# Patient Record
Sex: FEMALE | Race: BLACK OR AFRICAN AMERICAN | Employment: FULL TIME | URBAN - METROPOLITAN AREA
[De-identification: names, ages, dates, MRNs, and addresses within clinical notes are randomized per-mention and may not be internally consistent; named-entity substitution may affect disease eponyms.]

---

## 2018-06-27 ENCOUNTER — OFFICE VISIT (OUTPATIENT)
Dept: FAMILY MEDICINE | Facility: CLINIC | Age: 33
End: 2018-06-27
Payer: MEDICAID

## 2018-06-27 VITALS
RESPIRATION RATE: 21 BRPM | HEIGHT: 60 IN | BODY MASS INDEX: 30.9 KG/M2 | OXYGEN SATURATION: 98 % | SYSTOLIC BLOOD PRESSURE: 92 MMHG | DIASTOLIC BLOOD PRESSURE: 59 MMHG | TEMPERATURE: 98.1 F | WEIGHT: 157.38 LBS | HEART RATE: 60 BPM

## 2018-06-27 DIAGNOSIS — L81.1 MELASMA: Primary | ICD-10-CM

## 2018-06-27 NOTE — NURSING NOTE
Due to patient being non-English speaking/uses sign language, an  was used for this visit. Only for face-to-face interpretation by an external agency, date and length of interpretation can be found on the scanned worksheet.     name: Fanny Spears  Agency: [employer]  Language: José Miguelmo   Telephone number: [phone]  Type of interpretation: Face-to-face, spoken

## 2018-06-27 NOTE — PROGRESS NOTES
"       HPI:       Shireen Vazquez is a 33 year old  female with a significant past medical history of latent TB which has been treated who presents for the new concern(s) of    Skin discoloration   - Has been present for 2-3 years  - On her cheeks and forehead  - Has tried different lotions and creams, which are not helpful   - Started during one of her pregnancies, however has worsened over the last couple years   - Is not currently pregnant, however does not believe in birth control. Does not want to get pregnant. Using withdrawal method and denies wanting any other form of birth control.     An [employer]  was used for this visit.          PMHX:     Patient Active Problem List   Diagnosis     Female circumcision     Latent tuberculosis     Does not take any medications     Social History   Tobacco: never smoker   Alcohol: denies   Drugs: denies      No Known Allergies         Review of Systems:     10 point review of systems negative except for noted in HPI             Physical Exam:     Vitals:    06/27/18 1521   BP: 92/59   BP Location: Right arm   Patient Position: Sitting   Cuff Size: Adult Regular   Pulse: 60   Resp: 21   Temp: 98.1  F (36.7  C)   TempSrc: Oral   SpO2: 98%   Weight: 157 lb 6 oz (71.4 kg)   Height: 5' 0.43\" (153.5 cm)     Body mass index is 30.3 kg/(m^2).    Exam:  Constitutional: healthy, alert and no distress  Cardiovascular: Regular rate and rhythm. No murmurs, clicks gallops or rub  Respiratory: Lungs clear to auscultation. No wheezing or crackles present   Skin: Darkened macules and patches of skin covering bilateral cheeks and forehead. No other rashes or discoloration.   Psychiatric: mentation appears normal and affect normal/bright      Assessment and Plan     1. Melasma  Hyperpigmented macules which coalesce to patches in malar distribution over bilateral cheeks, bridge of nose and forehead is consistent with Melasma especially given initially started during a pregnancy and has " worsened with subsequent pregnancies. Given patient is no willing to use birth control at this time, do not feel tretinoin cream would be a good option. Will try an over the counter skin whitening agent, however may be limited by cost of product. Additionally recommended daily sunscreen usage to reduce further worsening of hyperpigmentation.   - Porcelana cream   - Sunscreen >30 spf daily      Options for treatment and follow-up care were reviewed with the patient and/or guardian. Shireen Vazquez and/or guardian engaged in the decision making process and verbalized understanding of the options discussed and agreed with the final plan.    Mona Kraus DO (PGY1)   Pager #[phone]    Precepted today with: Marlo Plunkett MD

## 2018-06-27 NOTE — PROGRESS NOTES
Preceptor Attestation:   Patient seen, evaluated and discussed with the resident. I have verified the content of the note, which accurately reflects my assessment of the patient and the plan of care.  Supervising Physician:Marlo Plunkett MD  [facility]

## 2018-06-27 NOTE — MR AVS SNAPSHOT
"              After Visit Summary   2018    Shireen Vazquez    MRN: [id number]           Patient Information     Date Of Birth          1985        Visit Information        Provider Department      2018 3:40 PM Mona Kraus DO [facility]        Today's Diagnoses     Melasma    -  1       Follow-ups after your visit        Follow-up notes from your care team     Return if symptoms worsen or fail to improve.      Who to contact     Please call your clinic at [phone] to:    Ask questions about your health    Make or cancel appointments    Discuss your medicines    Learn about your test results    Speak to your doctor            Additional Information About Your Visit        MyChart Information     [employer] is an electronic gateway that provides easy, online access to your medical records. With [employer], you can request a clinic appointment, read your test results, renew a prescription or communicate with your care team.     To sign up for [employer] visit the website at [email or web address]   You will be asked to enter the access code listed below, as well as some personal information. Please follow the directions to create your username and password.     Your access code is: [email or web address]  Expires: 2018 10:29 PM     Your access code will  in 90 days. If you need help or a new code, please contact your [facility] or call [phone] for assistance.        Care EveryWhere ID     This is your Care EveryWhere ID. This could be used by other organizations to access your [facility] medical records  [id number]        Your Vitals Were     Pulse Temperature Respirations Height Pulse Oximetry BMI (Body Mass Index)    60 98.1  F (36.7  C) (Oral) 21 5' 0.43\" (153.5 cm) 98% 30.3 kg/m2       Blood Pressure from Last 3 Encounters:   18 92/59   10/13/16 96/75   16 90/60    Weight from Last 3 Encounters:   18 157 lb 6 oz (71.4 kg)   16 156 " lb (70.8 kg)   09/22/16 154 lb 12.8 oz (70.2 kg)              Today, you had the following     No orders found for display         Today's Medication Changes          These changes are accurate as of 6/27/18 11:59 PM.  If you have any questions, ask your nurse or doctor.               Stop taking these medicines if you haven't already. Please contact your care team if you have questions.     acetaminophen 325 MG tablet   Commonly known as:  TYLENOL   Stopped by:  Mona Kraus DO           ferrous sulfate 325 (65 Fe) MG tablet   Commonly known as:  IRON   Stopped by:  Mona Kraus DO           ibuprofen 600 MG tablet   Commonly known as:  ADVIL/MOTRIN   Stopped by:  Mona Kraus DO           oxyCODONE IR 5 MG tablet   Commonly known as:  ROXICODONE   Stopped by:  Mona Kraus DO           PRENATAL 19 29-1 MG Chew   Stopped by:  Mona Kraus DO           senna-docusate 8.6-50 MG per tablet   Commonly known as:  SENOKOT-S;PERICOLACE   Stopped by:  Mona Kraus DO                    Primary Care Provider Office Phone #    Mona ROBERT Kraus [phone]       [address] [place] [address]        Equal Access to Services     COLIN NORTON AH: Hadii mariluz mareso Sovirgilio, waaxda luqadaha, qaybta kaalmada adeegyada, carline levi. So [facility] [phone].    ATENCIÓN: Si habla español, tiene a lopez disposición servicios gratuitos de asistencia lingüística. Llame al [phone].    We comply with applicable federal civil rights laws and [place] laws. We do not discriminate on the basis of race, color, national origin, age, disability, sex, sexual orientation, or gender identity.            Thank you!     Thank you for choosing [facility]  for your care. Our goal is always to provide you with excellent care. Hearing back from our patients is one way we can continue to improve our services. Please take a few minutes to complete the written survey that  you may receive in the mail after your visit with us. Thank you!             Your Updated Medication List - Protect others around you: Learn how to safely use, store and throw away your medicines at www.disposemymeds.org.      Notice  As of 6/27/2018 11:59 PM    You have not been prescribed any medications.

## 2018-06-28 NOTE — NURSING NOTE
Due to patient being non-English speaking/uses sign language, an  was used for this visit. Only for face-to-face interpretation by an external agency, date and length of interpretation can be found on the scanned worksheet.     name: america Escobedo  Agency: [facility]  Language: [facility]   Telephone number: [phone]  Type of interpretation: Face-to-face, spoken

## 2019-01-24 ENCOUNTER — OFFICE VISIT (OUTPATIENT)
Dept: FAMILY MEDICINE | Facility: CLINIC | Age: 34
End: 2019-01-24
Payer: COMMERCIAL

## 2019-01-24 VITALS
BODY MASS INDEX: 31.06 KG/M2 | HEART RATE: 57 BPM | WEIGHT: 158.2 LBS | RESPIRATION RATE: 20 BRPM | DIASTOLIC BLOOD PRESSURE: 61 MMHG | OXYGEN SATURATION: 100 % | SYSTOLIC BLOOD PRESSURE: 103 MMHG | HEIGHT: 60 IN | TEMPERATURE: 98 F

## 2019-01-24 DIAGNOSIS — R10.31 ABDOMINAL PAIN, RIGHT LOWER QUADRANT: Primary | ICD-10-CM

## 2019-01-24 LAB
BILIRUBIN UR: NEGATIVE
BLOOD UR: NEGATIVE
GLUCOSE URINE: NEGATIVE
KETONES UR QL: NEGATIVE
LEUKOCYTE ESTERASE UR: NEGATIVE
NITRITE UR QL STRIP: NEGATIVE
PH UR STRIP: 7 [PH] (ref 5–7)
PROTEIN UR: NEGATIVE
SP GR UR STRIP: 1.02
UROBILINOGEN UR STRIP-ACNC: NORMAL

## 2019-01-24 ASSESSMENT — MIFFLIN-ST. JEOR: SCORE: 1338.06

## 2019-01-24 NOTE — PROGRESS NOTES
"       HPI:       Shireen Vazquez is a 34 year old  female with no significant past medical history who presents for the following concern:    Abdominal pain   -Abdominal pain has been present on and off for the last 2 months.  - Unchanged from the last 2 months  -Worse prior to and after menses, not worsened with menses  -History of irregular periods in the past.  Menses on 12/17 and 1/13 which were more heavy than prior.  - No fevers, chills, no dysuria, frequency, increased vaginal discharge  - Not on birth control  -Being primarily in right lower quadrant to groin  -Not taking any medications for the pain  -Has regular soft bowel movements daily  -Denies worsening of pain with food  -Denies dysuria, frequency, urgency    An [employer]  was used for this visit.            PMHX:     Patient Active Problem List   Diagnosis     Female circumcision     Latent tuberculosis       No current outpatient medications on file.       Social History     Tobacco Use     Smoking status: Never Smoker     Smokeless tobacco: Never Used   Substance Use Topics     Alcohol use: No     Alcohol/week: 0.0 oz     Drug use: No      No Known Allergies           Review of Systems:     10 point review of systems negative except for noted in HPI             Physical Exam:     Vitals:    01/24/19 1626   BP: 103/61   Pulse: 57   Resp: 20   Temp: 98  F (36.7  C)   SpO2: 100%   Weight: 71.8 kg (158 lb 3.2 oz)   Height: 1.53 m (5' 0.25\")     Body mass index is 30.64 kg/m .    Exam:  Constitutional: healthy, alert and no distress  Cardiovascular: Regular rate and rhythm. No murmurs, clicks gallops or rub  Respiratory: Lungs clear to auscultation. No wheezing or crackles present   Abdomen:  Abdomen soft, non-tender. Non-distended. BS normal. No rebound tenderness or guarding.   Psychiatric: mentation appears normal and affect normal/bright      Assessment and Plan     1. Abdominal pain, right lower quadrant  Etiology of right lower quadrant pain " unclear at this time. Overall exam unremarkable with no reproducible pain in the area, which is very reassuring.  Differential would include endometriosis, though timing not necessarily consistent with this.  UA performed with no evidence of infection.  Given chronicity of abdominal pain and afebrile, unlikely related to appendicitis.  Location of pain not consistent with kidney stone and no blood on UA. Will perform pelvic US given location of pain with follow up after results have returned and further workup if needed.   - Urinalysis([facility])  - Urine Culture ([facility])  - US PELVIS COMPLETE  -Tylenol as needed for pain  -Discussed indications to return to clinic or ED    Follow up after results of pelvic ultrasound have returned    Options for treatment and follow-up care were reviewed with the patient and/or guardian. Shireen Vazquez and/or guardian engaged in the decision making process and verbalized understanding of the options discussed and agreed with the final plan.    Mona Kraus DO (PGY2)   Pager #[phone]    Precepted today with: Marlo Plunkett MD

## 2019-01-24 NOTE — NURSING NOTE
Due to patient being non-English speaking/uses sign language, an  was used for this visit. Only for face-to-face interpretation by an external agency, date and length of interpretation can be found on the scanned worksheet.     name: Damián Dyer  Agency: [facility]  Language: [employer]   Telephone number: ?  Type of interpretation: Face-to-face, spoken

## 2019-01-25 ENCOUNTER — RECORDS - HEALTHEAST (OUTPATIENT)
Dept: ADMINISTRATIVE | Facility: OTHER | Age: 34
End: 2019-01-25

## 2019-01-25 LAB — CULTURE: ABNORMAL

## 2019-02-12 ENCOUNTER — TELEPHONE (OUTPATIENT)
Dept: FAMILY MEDICINE | Facility: CLINIC | Age: 34
End: 2019-02-12

## 2019-02-12 NOTE — PATIENT INSTRUCTIONS
Referral for (Test):[facility]   Location/Place/Provider: [facility]   Date/Time:02/15/19 at 11:00am     Phone: [phone]  Fax:   Additional information/prep.:   Scheduled by:CARMINE Nguyen

## 2019-02-12 NOTE — TELEPHONE ENCOUNTER
I got a message from the  about helping the pt setup a ride for the pt appointment for 02/15/19 at 11:00am.  The pt is going to [facility].  I was able to setup the ride for the pt, they do not have the transportation company till the day before.  The pt trip log number is [id number].

## 2019-02-15 ENCOUNTER — HOSPITAL ENCOUNTER (OUTPATIENT)
Dept: ULTRASOUND IMAGING | Facility: CLINIC | Age: 34
Discharge: HOME | End: 2019-02-15
Attending: FAMILY MEDICINE

## 2019-02-15 DIAGNOSIS — R10.31 ABDOMINAL PAIN, RIGHT LOWER QUADRANT: ICD-10-CM

## 2019-02-18 ENCOUNTER — TELEPHONE (OUTPATIENT)
Dept: FAMILY MEDICINE | Facility: CLINIC | Age: 34
End: 2019-02-18

## 2019-02-21 NOTE — PROGRESS NOTES
HPI:       Shireen Vazquez is a 34 year old  female without a significant past medical history who presents for the following concern:    Right Lower Quadrant Abdominal Pain  - On and off for the last 3 months   - Was seen on 1/24/19 for this issue. Exam overall reassuring at that time. UA with no evidence of infection or hematuria, therefore unlikely UTI or nephrolithiasis. Pelvic US as below  - Worse with eating food, particularly pizza or other greasy foods   - BM daily and soft. Does not have to strain with BMs    US finding   - Pelvis US performed and showed a heterogenous LEFT ovary. Could be a hemorrhagic cyst or hemangioma. Will need follow up in 8 weeks to assure resolution (April 12)   - Feel this is unlikely causing patient's right sided pain     An "[employer]  was used for this visit.          PMHX:     Patient Active Problem List   Diagnosis     Female circumcision     Latent tuberculosis       No current outpatient medications     Social History     Tobacco Use     Smoking status: Never Smoker     Smokeless tobacco: Never Used   Substance Use Topics     Alcohol use: No     Alcohol/week: 0.0 oz     Drug use: No        Allergies   Allergen Reactions     No Known Allergies      Office Visit on 01/24/2019   Component Date Value Ref Range Status     Specific Gravity Urine 01/24/2019 1.020  1.005 - 1.030 Final     pH Urine 01/24/2019 7.0  4.5 - 8.0 Final     Leukocyte Esterase UR 01/24/2019 Negative  NEGATIVE Final     Nitrite Urine 01/24/2019 Negative  NEGATIVE Final     Protein UR 01/24/2019 Negative  NEGATIVE Final     Glucose Urine 01/24/2019 Negative  NEGATIVE Final     Ketones Urine 01/24/2019 Negative  NEGATIVE Final     Urobilinogen mg/dL 01/24/2019 0.2 E.U./dL  0.2 E.U./dL Final     Bilirubin UR 01/24/2019 Negative  NEGATIVE Final     Blood UR 01/24/2019 Negative  NEGATIVE Final     Culture 01/24/2019 *  Final     US Pelvic   US PELVIS WITH TRANSVAGINAL NON OB  2/15/2019 12:16  "PM    INDICATION: Right lower quad pain for x2 months, intermittent.  TECHNIQUE: Transabdominal scans were performed. Endovaginal ultrasound was performed to better visualize the adnexa.  COMPARISON: None.     FINDINGS:  Uterus measures 9.1 x 6.8 x 2.9 cm. Normal uterus with no masses.    Endometrial thickness is 4 mm. Normal smooth endometrium.    Right ovary measures 3.4 x 2.2 x 2.8 cm. Normal right ovary. Normal arterial duplex.    Left ovary measures 4.4 x 4.4 x 3.4 cm. Heterogeneous appearance of the left ovary with intrinsic low level and mildly heterogeneous echogenicity. Normal arterial duplex.    No significant free fluid in the cul-de-sac.         Review of Systems:     10 point review of systems negative except for noted in HPI             Physical Exam:     Vitals:    02/22/19 0924   BP: 93/56   Pulse: 62   Resp: 16   SpO2: 98%   Weight: 73.9 kg (163 lb)   Height: 1.565 m (5' 1.61\")     Body mass index is 30.19 kg/m .    Exam:  Constitutional: healthy, alert and no distress  Cardiovascular: Regular rate and rhythm. No murmurs, clicks gallops or rub  Respiratory: Lungs clear to auscultation. No wheezing or crackles present   Abdomen:  Abdomen soft, non-tender. BS normal. No masses, organomegaly  Psychiatric: mentation appears normal and affect normal/bright    Assessment and Plan     1. RLQ abdominal pain  Pain remains intermittent, though states it is worse with greasy foods, which sounds suspicious for biliary colic, though location is not correct. Recommended hepatic panel, lipase, RUQ US, however patient declined. Patient come back if the pain is more bothersome or not responding to to dietary changes. Feel this is reasonable given benign abdominal exam, duration of 3 months and stable vitals. Very unlikely to be appendicitis. Also considered constipation as source of pain, though is having regular soft BMs daily, so seems less likely. Discussed also returning to clinic if she develops any fevers, " chills or worsening abdominal pain.    - Follow up PRN    2. Hemorrhagic cyst of left ovary  Discussed results of US showing heterogenous left ovary which could be a hemorrhagic cyst or hemangioma. Unlikely to be causing pain. Recommend follow up in 8 weeks (after April 12th)   - US PELVIS COMPLETE scheduled today     Follow up as needed for abdominal pain     Options for treatment and follow-up care were reviewed with the patient and/or guardian. Shireen George and/or guardian engaged in the decision making process and verbalized understanding of the options discussed and agreed with the final plan.    Mona Kraus DO (PGY2)   Pager #[phone]    Precepted today with: Dr. Edd Tan

## 2019-02-22 ENCOUNTER — OFFICE VISIT (OUTPATIENT)
Dept: FAMILY MEDICINE | Facility: CLINIC | Age: 34
End: 2019-02-22
Payer: COMMERCIAL

## 2019-02-22 ENCOUNTER — RECORDS - HEALTHEAST (OUTPATIENT)
Dept: ADMINISTRATIVE | Facility: OTHER | Age: 34
End: 2019-02-22

## 2019-02-22 VITALS
RESPIRATION RATE: 16 BRPM | HEART RATE: 62 BPM | OXYGEN SATURATION: 98 % | BODY MASS INDEX: 30 KG/M2 | HEIGHT: 62 IN | DIASTOLIC BLOOD PRESSURE: 56 MMHG | SYSTOLIC BLOOD PRESSURE: 93 MMHG | WEIGHT: 163 LBS

## 2019-02-22 DIAGNOSIS — R10.31 RLQ ABDOMINAL PAIN: Primary | ICD-10-CM

## 2019-02-22 DIAGNOSIS — N83.202 HEMORRHAGIC CYST OF LEFT OVARY: ICD-10-CM

## 2019-02-22 ASSESSMENT — MIFFLIN-ST. JEOR: SCORE: 1381.48

## 2019-02-22 NOTE — PATIENT INSTRUCTIONS
Referral for ( TEST )  :      US Pelvis Complete   LOCATION/PLACE/Provider :   [facility]   DATE & TIME :     4- at 10:00am  PHONE :     [phone]  FAX :     [phone]  ADDITIONAL INFORMATION :     Need a full bladder   Appointment made by clinic staff/:    Suzy

## 2019-02-25 NOTE — PROGRESS NOTES
I have personally reviewed the history and examination as documented by Dr. Kraus.  I was present during key portions of the visit and agree with the assessment and plan as documented for 34 yr old female with chronic RLQ abd pain here for follow-up. Pelvic U/S w/ L hemorrhagic ovarian cyst/ no cause for RLQ pain. Will repeat pelvic U/S to monitor ovarian cyst. Will hold on further eval of RLQ pain. Precautions given. Anticipatory guidance given.     Edd Tan MD  February 25, 2019  8:51 AM

## 2019-06-25 ENCOUNTER — AMBULATORY - HEALTHEAST (OUTPATIENT)
Dept: ADMINISTRATIVE | Facility: REHABILITATION | Age: 34
End: 2019-06-25

## 2019-06-25 ENCOUNTER — OFFICE VISIT (OUTPATIENT)
Dept: FAMILY MEDICINE | Facility: CLINIC | Age: 34
End: 2019-06-25
Payer: COMMERCIAL

## 2019-06-25 VITALS
TEMPERATURE: 98.1 F | OXYGEN SATURATION: 99 % | BODY MASS INDEX: 30.85 KG/M2 | HEIGHT: 61 IN | DIASTOLIC BLOOD PRESSURE: 60 MMHG | WEIGHT: 163.4 LBS | SYSTOLIC BLOOD PRESSURE: 100 MMHG | RESPIRATION RATE: 20 BRPM | HEART RATE: 66 BPM

## 2019-06-25 DIAGNOSIS — M54.50 ACUTE RIGHT-SIDED LOW BACK PAIN WITHOUT SCIATICA: ICD-10-CM

## 2019-06-25 DIAGNOSIS — M54.50 ACUTE RIGHT-SIDED LOW BACK PAIN WITHOUT SCIATICA: Primary | ICD-10-CM

## 2019-06-25 ASSESSMENT — MIFFLIN-ST. JEOR: SCORE: 1365.62

## 2019-06-25 NOTE — PROGRESS NOTES
Preceptor Attestation:   Patient seen, evaluated and discussed with the resident. I have verified the content of the note, which accurately reflects my assessment of the patient and the plan of care.  Supervising Physician:Brandy Kelsey MD  [facility]

## 2019-06-25 NOTE — NURSING NOTE
Due to patient being non-English speaking/uses sign language, an  was used for this visit. Only for face-to-face interpretation by an external agency, date and length of interpretation can be found on the scanned worksheet.     name: Veronique Reilly  Agency: [employer]  Language: [facility]   Telephone number: [phone]  Type of interpretation: Face-to-face, spoken

## 2019-06-25 NOTE — PATIENT INSTRUCTIONS
Referral for ( TEST )  :      Physical Therapy   LOCATION/PLACE/Provider :    [facility]   DATE & TIME :     Location will call the patient to schedule  PHONE :     [phone]  FAX :     [phone]  Appointment made by clinic staff/:    Colleen

## 2019-06-25 NOTE — PROGRESS NOTES
"       HPI       Shireen Vazquez is a 34 year old female with PMH latent tuberculosis who presents for:    Back pain  Right low back  Fall on snow in Apr/May 2019  Slipped on landed on right buttock  Constant pain since then  Seems to radiate to R abd but does not feel related to RLQ abd pain for which last seen 4 months ago and which has since resolved  Does not radiate down leg  No numbness or tingling  Vicks didn't help  Self-massage does make it feel better  Worse with standing for a long time, as when cooking  Otherwise does not affect ADLs  Does not work outside the home, has 4 children ages 2-11  No history of back pain prior to fall  Went to therapy in the distant past for knee pain but not for back pain  No tobacco, ETOH, illicit drug use    [employer]  was required for this visit.    Patient Active Problem List   Diagnosis     Female circumcision     Latent tuberculosis       No current outpatient medications        Allergies   Allergen Reactions     No Known Allergies        No results found for this or any previous visit (from the past 24 hour(s)).       Review of Systems:   Complete 10-point ROS negative except as per HPI           Physical Exam:     Vitals:    06/25/19 1001   BP: 100/60   Pulse: 66   Resp: 20   Temp: 98.1  F (36.7  C)   SpO2: 99%   Weight: 74.1 kg (163 lb 6.4 oz)   Height: 1.537 m (5' 0.5\")     Body mass index is 31.39 kg/m .    GENERAL: healthy, alert and no distress  RESP: lungs clear to auscultation - no rales, no rhonchi, no wheezes  CV: regular rates and rhythm, normal S1 S2, no S3 or S4 and no murmur, no click or rub  ABDOMEN: soft, no tenderness  BACK: ambulates without limp, maneuvers onto the exam table without difficulty, localized tenderness to palpation over R iliac crest/ external obliques, full forward flexion and extension and lateral side bends without difficulty, forward flexion and side bend to the left (contralateral side) causes stretching of the affected area, " negative straight leg raise, flexion of the R hip against resistance elicits pain but full strength  MS: extremities- no gross deformities noted, no edema      Assessment and Plan     Shireen was seen today for back pain and medication reconciliation.    Diagnoses and all orders for this visit:    Acute right-sided low back pain without sciatica  Associated with fall from standing height approx 2 months ago.  No prior history of the same.  Has not been stretching at home.  I showed the patient specific stretching exercises during the visit today, of which cross body stretching of the right leg seemed most beneficial.  I think the patient would greatly benefit from a trial of physical therapy to assist with specific stretches for ROM, flexibility and strengthening.  Patient in agreement with this plan.  -     PHYSICAL THERAPY REFERRAL; Future    Follow-up as needed for back pain.    Options for treatment and follow-up care were reviewed with the patient and/or guardian. Shireen Vazquez and/or guardian engaged in the decision making process and verbalized understanding of the options discussed and agreed with the final plan.    Precepted with Dr. Brandy Kelsey.  Norma Martínez MD  [facility] PGY-1

## 2019-07-11 ENCOUNTER — OFFICE VISIT - HEALTHEAST (OUTPATIENT)
Dept: PHYSICAL THERAPY | Facility: REHABILITATION | Age: 34
End: 2019-07-11

## 2019-07-11 DIAGNOSIS — M53.3 SACROILIAC JOINT DYSFUNCTION OF RIGHT SIDE: ICD-10-CM

## 2019-07-18 ENCOUNTER — OFFICE VISIT - HEALTHEAST (OUTPATIENT)
Dept: PHYSICAL THERAPY | Facility: REHABILITATION | Age: 34
End: 2019-07-18

## 2019-07-18 DIAGNOSIS — M53.3 SACROILIAC JOINT DYSFUNCTION OF RIGHT SIDE: ICD-10-CM

## 2020-01-14 NOTE — PROGRESS NOTES
"  Subjective:   Shireen Vazquez is a 35 year old year old female who presents to clinic today for the following health issues:    ABDOMINAL PAIN     Onset: 2 months    Description:   Character: Burning  Location: right upper quadrant  Radiation: None    Intensity: moderate    Progression of Symptoms:  worsening    Accompanying Signs & Symptoms:  Fever/Chills?: no   Gas/Bloating: no   Nausea: YES  Vomitting: no   Diarrhea?: no   Constipation:no    History:   Previous similar pain: no      Precipitating factors:   Does the pain change with:     Food: YES- all foods cause the burning pain    Alleviating factors:  None known    Therapies Tried and outcome: none       2. Right ear pain: Difficulty hearing and severe pain in the right ear. Started 6-8 months ago. Feels like it is getting worse, feels like there is a \"whooshing wave\" sound. Pain is deep in the ear. Never had anything like this before. No bleeding or drainage from the ear. Feels like her hearing has worsened in the right ear. Has not tried any medications or treatments.    An [employer]  was used for this visit.     PMHX:     Patient Active Problem List   Diagnosis     Female circumcision     Latent tuberculosis     Acute right-sided low back pain without sciatica     Current Outpatient Medications   Medication Sig Dispense Refill     fluticasone (FLONASE) 50 MCG/ACT nasal spray Spray 1 spray into both nostrils daily for 14 days 15.8 mL 1     Social History     Tobacco Use     Smoking status: Never Smoker     Smokeless tobacco: Never Used   Substance Use Topics     Alcohol use: No     Alcohol/week: 0.0 standard drinks     Drug use: No      Allergies   Allergen Reactions     No Known Allergies        Review of Systems:     Constitutional, HEENT, cardiovascular, pulmonary, GI, musculoskeletal, neuro, skin, and psych systems are negative, except as otherwise noted.     Objective:     /67   Pulse 63   Temp 98.3  F (36.8  C) (Oral)   Resp 22   Ht 1.58 " "m (5' 2.21\")   Wt 77.6 kg (171 lb)   SpO2 98%   BMI 31.07 kg/m    Body mass index is 31.07 kg/m .  GENERAL APPEARANCE: healthy, alert and no distress,  HENT: right TM retracted with minimal movement with insufflation. No erythema or effusion. Left TM normal. Bilateral ear canals normal.   NECK: no adenopathy, no asymmetry  RESP: lungs clear to auscultation - no rales, rhonchi or wheezes  CV: regular rate and rhythm,  and no murmur, click,  rub or gallop  ABDOMEN: soft, nontender to palpation, negative Johnson's sign, no hepatosplenomegaly or masses appreciated  MS: extremities normal- no gross deformities noted  SKIN: no suspicious lesions or rashes on exposed skin    Assessment & Plan:     1. RUQ abdominal pain  Patient has been having non-specific, migrating abdominal pain over the past year. The fact that the pain is located in the RUQ and is provoked with eating is concerning for possible biliary colic. Also considered PUD given the burning nature of the pain or pancreatitis given the location. Will obtain RUQ ultrasound and hepatic panel and lipase for further evaluation. Patient to follow up after ultrasound is complete. If ultrasound is negative, would consider empiric treatment of PUD vs further endoscopic evaluation.  - Hepatic Profile ([facility])  - Lipase ([facility])  - RUQ US Abdomen Limited  - right upper quadrant; Future    2. Dysfunction of right eustachian tube  Retracted TM with minimal movement with insufflation concerning for eustachian tube dysfunction. Will treat with a 2 week trial of Flonase, patient to return if symptoms do not improve.   - fluticasone (FLONASE) 50 MCG/ACT nasal spray; Spray 1 spray into both nostrils daily for 14 days  Dispense: 15.8 mL; Refill: 1    Options for treatment and follow-up care were reviewed with the patient and/or guardian. Shireen Vazquez and/or guardian engaged in the decision making process and verbalized understanding of the options discussed and agreed with " the final plan.    Asuncion Venegas MD  [facility] Resident    Precepted with: Dion Camara MD

## 2020-01-15 ENCOUNTER — OFFICE VISIT (OUTPATIENT)
Dept: FAMILY MEDICINE | Facility: CLINIC | Age: 35
End: 2020-01-15
Payer: COMMERCIAL

## 2020-01-15 VITALS
HEIGHT: 62 IN | SYSTOLIC BLOOD PRESSURE: 100 MMHG | WEIGHT: 171 LBS | RESPIRATION RATE: 22 BRPM | BODY MASS INDEX: 31.47 KG/M2 | DIASTOLIC BLOOD PRESSURE: 67 MMHG | OXYGEN SATURATION: 98 % | HEART RATE: 63 BPM | TEMPERATURE: 98.3 F

## 2020-01-15 DIAGNOSIS — R10.11 RUQ ABDOMINAL PAIN: Primary | ICD-10-CM

## 2020-01-15 DIAGNOSIS — H69.91 DYSFUNCTION OF RIGHT EUSTACHIAN TUBE: ICD-10-CM

## 2020-01-15 LAB
ALBUMIN SERPL BCP-MCNC: 3.6 G/DL (ref 3.5–5)
ALP SERPL-CCNC: 57 U/L (ref 45–120)
ALT SERPL W/O P-5'-P-CCNC: <9 U/L (ref 0–45)
AST SERPL-CCNC: 12 U/L (ref 0–40)
BILIRUB DIRECT SERPL-MCNC: 0.1 MG/DL (ref 0–0.5)
BILIRUB SERPL-MCNC: 0.3 MG/DL (ref 0–1)
LIPASE SERPL-CCNC: 14 U/L (ref 0–52)
PROT SERPL-MCNC: 7 G/DL (ref 6–8)

## 2020-01-15 RX ORDER — FLUTICASONE PROPIONATE 50 MCG
1 SPRAY, SUSPENSION (ML) NASAL DAILY
Qty: 15.8 ML | Refills: 1 | Status: SHIPPED | OUTPATIENT
Start: 2020-01-15 | End: 2020-02-11

## 2020-01-15 ASSESSMENT — MIFFLIN-ST. JEOR: SCORE: 1422.15

## 2020-01-15 NOTE — PATIENT INSTRUCTIONS
Follow up with Dr. Kraus or one of the other blue team doctors after you get your ultrasound done.     Referral for :     RUQ US   LOCATION/PLACE/Provider :  [facility]   DATE & TIME :    Jan. 22nd at 11:15am   PHONE :     [phone]  FAX :    [phone]  Appointment made by clinic staff/:    Colleen

## 2020-01-17 NOTE — RESULT ENCOUNTER NOTE
Called with help from language line  Kyra [phone]. Results given over the phone.     ~ Vinny SARAVIA (Chrissi) CMA [facility]  [phone]

## 2020-01-22 ENCOUNTER — HOSPITAL ENCOUNTER (OUTPATIENT)
Dept: ULTRASOUND IMAGING | Facility: CLINIC | Age: 35
Discharge: HOME | End: 2020-01-22

## 2020-01-22 ENCOUNTER — RECORDS - HEALTHEAST (OUTPATIENT)
Dept: ADMINISTRATIVE | Facility: OTHER | Age: 35
End: 2020-01-22

## 2020-01-22 ENCOUNTER — TELEPHONE (OUTPATIENT)
Dept: FAMILY MEDICINE | Facility: CLINIC | Age: 35
End: 2020-01-22

## 2020-01-22 DIAGNOSIS — R10.11 RUQ ABDOMINAL PAIN: ICD-10-CM

## 2020-01-22 NOTE — PROGRESS NOTES
Preceptor Attestation:  Patient's case reviewed and discussed with Asuncion Venegas MD resident and I evaluated the patient. I agree with written assessment and plan of care.  Supervising Physician:  Dion Camara MD, MD [facility]

## 2020-01-22 NOTE — TELEPHONE ENCOUNTER
[facility] phone call message - order or referral request for patient:     Order or referral being requested: Abdomen Ultrasound    Additional Comments: Caller state there is an appointment for US at 11:00 today but no order. Please fax order to [phone]      OK to leave a message on voice mail? Yes    Primary language: Oromo      needed? Yes    Call taken on January 22, 2020 at 8:14 AM by Willie Fuentes

## 2020-02-10 NOTE — PROGRESS NOTES
HPI:       Shireen Vazquez is a 35 year old  female who presents for follow up of concern(s) listed below:    1. RUQ Abdominal Pain: Has been present for 2 months, describes it as burning in nature. Triggered by all types of foods. Associated with nausea. Labs obtained at last visit 1/15/2020 were all within normal limits. Limited abdominal ultrasound obtained 1/22/2020 showed a normal gallbladder without evidence of gallstones, wall thickening, or pericolic fluid. There was no biliary dilatation and the common duct was 3mm. The liver parenchyma was normal and the visualized portions of the pancreas and kidney were normal.     Office Visit on 01/15/2020   Component Date Value Ref Range Status     Bilirubin Total 01/15/2020 0.3  0.0 - 1.0 mg/dL Final     Bilirubin Direct 01/15/2020 0.1  <=0.5 mg/dL Final     Protein Total 01/15/2020 7.0  6.0 - 8.0 g/dL Final     Albumin 01/15/2020 3.6  3.5 - 5.0 g/dL Final     Alkaline Phosphatase 01/15/2020 57  45 - 120 U/L Final     AST (SGOT) 01/15/2020 12  0 - 40 U/L Final     ALT (SGPT) 01/15/2020 <9  0 - 45 U/L Final     Lipase 01/15/2020 14  0 - 52 U/L Final     Today, she reports that her abdominal pain has completely subsided. She denies nausea, vomiting, diarrhea, constipation. No dysuria. No fevers or chills.  She believes that the difference in her abdominal pain is that before she was not working and was sitting around the house. Now, she has a job that keeps her busy and active so she no longer is experiencing pain.     2. Immigration paperwork: Patient has many concerns about immigration paperwork. She was told that she needs a form from a physician as a part of this process, however patient does not have this form with her and was told that we had this paperwork available at our clinic.    An [employer]  was used for this visit.          History:     Patient Active Problem List   Diagnosis     Female circumcision     Latent tuberculosis     Acute right-sided  low back pain without sciatica     Class 1 obesity due to excess calories without serious comorbidity with body mass index (BMI) of 31.0 to 31.9 in adult     No current outpatient medications on file.     Social History     Tobacco Use     Smoking status: Never Smoker     Smokeless tobacco: Never Used   Substance Use Topics     Alcohol use: No     Alcohol/week: 0.0 standard drinks     Drug use: No     Allergies   Allergen Reactions     No Known Allergies           Review of Systems:     ROS neg other than the symptoms noted above in the HPI.          Physical Exam:     Vitals:    02/11/20 0816   BP: 98/63   Pulse: 66   Resp: 18   Temp: 97.8  F (36.6  C)   TempSrc: Oral   SpO2: 98%   Weight: 77.8 kg (171 lb 9.6 oz)     Body mass index is 31.18 kg/m .    GENERAL APPEARANCE: healthy, alert and no distress  EYES: Eyes grossly normal to inspection  RESP: Breathing comfortably on room air  CV: Radial pulse regular, acyanotic  ABDOMEN: soft, nontender abdomen  MS: extremities normal- no gross deformities noted  SKIN: no suspicious lesions or rashes on exposed skin  PSYCH: mood and affect normal/bright         Assessment and Plan:     Patient is a 35 year old female seen for:  1. RUQ abdominal pain  Pain has completely subsided. Labs and imaging ordered at last visit are reassuring against gallbladder, liver, or pancreas pathology at this time. Did consider possible H. Pylori infection, however given her resolution of symptoms patient is uninterested in testing at this time. Reviewed that if the pain were to come back, that she should return to clinic for further evaluation with possible H pylori stool testing at that time.    2. Immigration paperwork  Unclear what paperwork patient is requesting. I did have one of our social workers meet with the patient to discuss further. SW left a message with the immigration service on the card provided by the patient. Reviewed that we are willing to assist in this process, however we  need more information/clarity regarding the paperwork. Patient expressed understanding.     Options for treatment and follow-up care were reviewed with the patient and/or guardian. Shireen George and/or guardian engaged in the decision making process and verbalized understanding of the options discussed and agreed with the final plan.      Asuncion Venegas MD  [facility] Resident      Precepted with: Mikayla Franklin MD      ADDENDUM 12:02 PM    Additional information received from patient's immigration services. The form in question is Form N-648, Medical Certification for Disability Exceptions. Based on my physical exam I do not see that there is a physical disability that would qualify patient for completion of this form. I have reviewed patient's medical record; I do not see any documentation of developmental disability or mental impairment in the chart. Will refer patient to formal neuropsych testing to determine if one of these impairments exists.     Asuncion Venegas MD  [facility] Medicine Resident  P: [phone]

## 2020-02-11 ENCOUNTER — CARE COORDINATION (OUTPATIENT)
Dept: FAMILY MEDICINE | Facility: CLINIC | Age: 35
End: 2020-02-11

## 2020-02-11 ENCOUNTER — OFFICE VISIT (OUTPATIENT)
Dept: FAMILY MEDICINE | Facility: CLINIC | Age: 35
End: 2020-02-11
Payer: COMMERCIAL

## 2020-02-11 VITALS
TEMPERATURE: 97.8 F | OXYGEN SATURATION: 98 % | BODY MASS INDEX: 31.18 KG/M2 | WEIGHT: 171.6 LBS | DIASTOLIC BLOOD PRESSURE: 63 MMHG | RESPIRATION RATE: 18 BRPM | SYSTOLIC BLOOD PRESSURE: 98 MMHG | HEART RATE: 66 BPM

## 2020-02-11 DIAGNOSIS — Z02.9 ADMINISTRATIVE ENCOUNTER: ICD-10-CM

## 2020-02-11 DIAGNOSIS — R10.11 RUQ ABDOMINAL PAIN: Primary | ICD-10-CM

## 2020-02-11 PROBLEM — E66.811 CLASS 1 OBESITY DUE TO EXCESS CALORIES WITHOUT SERIOUS COMORBIDITY WITH BODY MASS INDEX (BMI) OF 31.0 TO 31.9 IN ADULT: Status: ACTIVE | Noted: 2020-02-11

## 2020-02-11 PROBLEM — E66.09 CLASS 1 OBESITY DUE TO EXCESS CALORIES WITHOUT SERIOUS COMORBIDITY WITH BODY MASS INDEX (BMI) OF 31.0 TO 31.9 IN ADULT: Status: ACTIVE | Noted: 2020-02-11

## 2020-02-11 NOTE — PROGRESS NOTES
Pt said she was applying for citizenship and was told the meet with her doctor to complete a form. Pt unsure about the form needed, but said she had thinking problems and did not understand english. Had pt sign BERNARD and contacted the [employer] [place] ([phone]) and [facility] regarding pt's case.     SW told pt he would reach out to pt as soon as the clinic knew more details about the paperwork needed.

## 2020-02-11 NOTE — PROGRESS NOTES
Preceptor Attestation:   Patient seen, evaluated and discussed with the resident. I have verified the content of the note, which accurately reflects my assessment of the patient and the plan of care.  Supervising Physician:Mikayla Franklin MD  [facility]

## 2020-02-11 NOTE — PROGRESS NOTES
Received a call back from  @ [facility]. Was told pt needed to complete N-648 Form that exempts pt from needing to complete citizenship test due to mental or physical disability. Printed off N-648 form and relayed info the Dr. Venegas.

## 2020-02-11 NOTE — PROGRESS NOTES
Pt was referred to receive neuropsych testing. [facility]'s @ [facility]. [facility] in [place] has neuropsych scheduled out for 2 to 3 weeks. W/ Joycelyn , Called pt to let her know about needed testing. No answer. Unable to [facility].

## 2020-02-11 NOTE — NURSING NOTE
Due to patient being non-English speaking/uses sign language, an  was used for this visit. Only for face-to-face interpretation by an external agency, date and length of interpretation can be found on the scanned worksheet.     name: ID# [id number]  Agency: [employer]  Language: Oromo   Telephone number: IPAD  Type of interpretation: Telemedicine, spoken

## 2020-02-17 ENCOUNTER — DOCUMENTATION ONLY (OUTPATIENT)
Dept: PSYCHOLOGY | Facility: CLINIC | Age: 35
End: 2020-02-17

## 2020-02-19 NOTE — PATIENT INSTRUCTIONS
Referral for (Test): Neuropsychological Testing    Location/Place/Provider:[facility],[address],   [place] [address]     Date/Time:    Phone:[phone]  Fax: [phone]  Additional information/prep.: I faxed over the referral and visit notes, they will contact the pt and schedule an appointment.   Scheduled by: CARMINE Nguyen

## 2020-02-28 ENCOUNTER — TELEPHONE (OUTPATIENT)
Dept: FAMILY MEDICINE | Facility: CLINIC | Age: 35
End: 2020-02-28

## 2020-02-28 NOTE — TELEPHONE ENCOUNTER
Out going call made using a [facility]  ([phone]) to follow up referral for Neuropsychic testing. Message left for patient letting her know referral, facesheet and most recent clinic visit note faxed/confirmed to [facility] [phone].

## 2020-03-03 ENCOUNTER — OFFICE VISIT (OUTPATIENT)
Dept: FAMILY MEDICINE | Facility: CLINIC | Age: 35
End: 2020-03-03
Payer: COMMERCIAL

## 2020-03-03 VITALS
BODY MASS INDEX: 31.1 KG/M2 | HEIGHT: 62 IN | RESPIRATION RATE: 16 BRPM | TEMPERATURE: 97.3 F | WEIGHT: 169 LBS | SYSTOLIC BLOOD PRESSURE: 94 MMHG | OXYGEN SATURATION: 100 % | HEART RATE: 59 BPM | DIASTOLIC BLOOD PRESSURE: 53 MMHG

## 2020-03-03 DIAGNOSIS — T74.21XA SEXUAL ASSAULT OF ADULT, INITIAL ENCOUNTER: ICD-10-CM

## 2020-03-03 ASSESSMENT — MIFFLIN-ST. JEOR: SCORE: 1413.08

## 2020-03-03 NOTE — LETTER
3/3/2020    Re: Shireen Vazquez  1985      To Whom It May Concern:     Shireen Vazquez is a patient at our clinic.  It has come to our attention that she is working through the process for immigration to the [place].  Patient primary language is Oromo.  She cannot read or write in English.  Patient emigrated to the [place] in [place].  She has tried to learn English though classes and support but to this point has been unable to despite her best efforts.      Patient requires an exemption at this time for the citizenship exam as she is unable to understand the contents of the examination in English.     Please waive the citizenship examination in her appeal for citizenship.      Thank you for your consideration,               Alessandra Cerda MD  3/3/2020 9:31 AM
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
## 2020-03-03 NOTE — NURSING NOTE
Due to patient being non-English speaking/uses sign language, an  was used for this visit. Only for face-to-face interpretation by an external agency, date and length of interpretation can be found on the scanned worksheet.     name: Jelani,  ID: [id number]  Agency: language line [employer]  Language: Oromo   Telephone number: [phone]  Type of interpretation: Telephone, spoken

## 2020-03-13 ENCOUNTER — TELEPHONE (OUTPATIENT)
Dept: FAMILY MEDICINE | Facility: CLINIC | Age: 35
End: 2020-03-13

## 2020-03-13 NOTE — TELEPHONE ENCOUNTER
"SW was told that pt thought she needed to become citizen to change her son's last name. Contact [facility] ([phone]) and was told that was not necessary, that pt needed to file request w/ the courts.     Contacted [facility] ([phone]) and was told pt did not need to have [place] citizenship to ascencio son's name. Would need to:     -File $300 fee  -Would need to notify pt's father of name change  -Attend court date with 2 witnesses that have know pt for at least 1 year.     Was also encouraged to visit [place] and search \"name change.\"  "

## 2020-03-13 NOTE — TELEPHONE ENCOUNTER
Incoming call received from [facility] to advise they have scheduled patient for a consult 04/08/2020@9:30 am. Per report multiple calls have been made to confirm upcoming clinic visit however, Shireen has not returned any of the clinics calls. She needs to touch base to confirm upcoming appt and if transportation needs to be scheduled.

## 2020-03-17 ENCOUNTER — TELEPHONE (OUTPATIENT)
Dept: FAMILY MEDICINE | Facility: CLINIC | Age: 35
End: 2020-03-17

## 2020-03-17 NOTE — TELEPHONE ENCOUNTER
Incoming from pt's son, who was asking what appt pt had on 4/8 @ [facility]. SW said he would call back w/  and asked to put pt on the phone.     W/ Joycelyn , spoke w/ pt, who said she received a VM from this clinic saying she had a 4/8 appt. SW looked back @ chart history. Tatiana Nagy on received call on 3/13 from [facility] saying pt had 4/8 appt @ 9:30 AM. Pt asked if SW could change apt to a Tuesday, as pt reported she was now working.     Call [facility] ([phone]), who said pt was not scheduled for any appt at any of their clinics.

## 2020-03-19 NOTE — TELEPHONE ENCOUNTER
W/ José Miguelmo , called to let pt know she did not have any appt scheduled / [facility] and offered to help schedule if pt was still pursuing exemption for English and Civics test. No answer. LVM letting pt know to call clinic's main line so SW could reach out.

## 2020-04-07 NOTE — TELEPHONE ENCOUNTER
W/ José Miguelmo , called to let pt know she did not have any appt scheduled / [facility] and offered to help schedule if pt was still pursuing exemption for English and Civics test. No answer. LVM letting pt know to call clinic's main line so SW could reach out

## 2020-04-09 ENCOUNTER — TELEPHONE (OUTPATIENT)
Dept: FAMILY MEDICINE | Facility: CLINIC | Age: 35
End: 2020-04-09

## 2020-04-10 NOTE — TELEPHONE ENCOUNTER
W/ Joycelyn , called pt to offer further assistant w/ scheduling neuropsychology assessment for citizenship application. Pt said the citizenship office is closed due to COVID-19 and they will f/u once they are back open. Wants to hold off on scheduling as she is trying to stay inside.

## 2020-05-07 ENCOUNTER — OFFICE VISIT (OUTPATIENT)
Dept: FAMILY MEDICINE | Facility: CLINIC | Age: 35
End: 2020-05-07
Payer: OTHER MISCELLANEOUS

## 2020-05-07 VITALS
OXYGEN SATURATION: 100 % | DIASTOLIC BLOOD PRESSURE: 64 MMHG | TEMPERATURE: 99.1 F | HEART RATE: 65 BPM | RESPIRATION RATE: 20 BRPM | BODY MASS INDEX: 29.45 KG/M2 | WEIGHT: 156 LBS | SYSTOLIC BLOOD PRESSURE: 96 MMHG | HEIGHT: 61 IN

## 2020-05-07 DIAGNOSIS — S80.02XA CONTUSION OF LEFT KNEE, INITIAL ENCOUNTER: Primary | ICD-10-CM

## 2020-05-07 DIAGNOSIS — S90.31XA CONTUSION OF RIGHT FOOT, INITIAL ENCOUNTER: ICD-10-CM

## 2020-05-07 ASSESSMENT — MIFFLIN-ST. JEOR: SCORE: 1329.11

## 2020-05-07 NOTE — LETTER
RETURN TO WORK/SCHOOL FORM    5/7/2020    Re: Shireen Vazquez  1985      To Whom It May Concern:     Shireen Vazquez was seen in clinic today..  She may return to work with restrictions on 5/8/20          Restrictions: Full Duty. Limited to 6 hour workday. Until seen and re-evaluated in 2 weeks       Mona Kraus,   5/7/2020 4:01 PM

## 2020-05-07 NOTE — NURSING NOTE
"Chief Complaint   Patient presents with     Work Comp     left leg and right foot injury from a table falling. DOI: 5/7/2020. Needs work restrictions faxed to HR. BERNARD obtained.      Medication Reconciliation     completed.        BP 96/64   Pulse 65   Temp 99.1  F (37.3  C) (Oral)   Resp 20   Ht 1.54 m (5' 0.63\")   Wt 70.8 kg (156 lb)   LMP 04/18/2020 (Approximate)   SpO2 100%   BMI 29.84 kg/m        ~ Vinny Fuentes CMA (Chrissi)  [facility]  Phone: [phone]      Due to patient being non-English speaking/uses sign language, an  was used for this visit. Only for face-to-face interpretation by an external agency, date and length of interpretation can be found on the scanned worksheet.     name: Jelani #AMAA  Agency: [employer]  Language: Oromo   Telephone number: Language [employer]  Type of interpretation: Telephone, spoken      "

## 2020-05-07 NOTE — PROGRESS NOTES
"       HPI:       Shireen Vazquez is a 35 year old female who presents for the following concern:    Left Knee Pain   Right Foot Pain   - Injury occurred at work   - Table fell onto left knee and then onto right foot after she picked up a box of tomatoes   - Pain is worse in the foot than the knee, though states that it is only very slight pain and she was able to walk home from work after the injury without issue   - No swelling since the injury   - Occurred today prior to coming in  - No prior knee or foot pain   - Walking or weight bearing does not make her symptoms worse, though has not been on her feet for long period of time since the injury so is not entirely sure how she will do   - 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 She is standing for 8 hours daily. She does have to lift boxes for her work, but believes they are all less than 10 lbs. Does not bend or squat regularly for her duties     An [employer]  was used for this visit.          PMHX:     Past medical history reviewed     Mediations reviewed     Social history reviewed     No known allergies     Family history reviewed           Review of Systems:       10 point review of systems negative except for noted in HPI             Physical Exam:     Vitals:    05/07/20 1522   BP: 96/64   Pulse: 65   Resp: 20   Temp: 99.1  F (37.3  C)   TempSrc: Oral   SpO2: 100%   Weight: 70.8 kg (156 lb)   Height: 1.54 m (5' 0.63\")     Exam:  Constitutional: healthy, alert and no distress  Musculoskeletal: Full ROM of bilateral knees. Very minimal tenderness to very deep palpation over medial tibia, just distal to tibial plateau. Negative stressing of ACL, PCL, MCL and LCL with no laxity. Negative McMurrys. Sensation intact, no overlying erythema or appreciable effusion. Right ankle with full active and passive ROM and normal strength. Sensation intact, no overlying erythema, ecchymoses or edema. Very minimal " pain with deep palpation to dorsal proximal 4th metatarsal. No other bony tenderness. Normal gait.   Psychiatric: mentation appears normal and affect normal/bright      Assessment and Plan     1. Contusion of left knee, initial encounter  2. Contusion of right foot, initial encounter  Contusion of the left knee and right foot after a table fell on these regions at work today. Exam overall very reassuring, and therefore will hold off on x-rays today. Recommend return to work at full duties, however limited to 6 hour work day. Will follow up with patient in 2 weeks to re-evaluate work ability. If symptoms not improved recommend imaging at that visit. Work restrictions given to patient and faxed to employer.     Follow up in 2 weeks for re-evaluation     Options for treatment and follow-up care were reviewed with the patient and/or guardian. Shireen George and/or guardian engaged in the decision making process and verbalized understanding of the options discussed and agreed with the final plan.    Mona Kraus DO (PGY3)   Pager #[phone]    Precepted today with: Edd Tan MD

## 2020-05-11 ENCOUNTER — TELEPHONE (OUTPATIENT)
Dept: FAMILY MEDICINE | Facility: CLINIC | Age: 35
End: 2020-05-11

## 2020-05-11 NOTE — PROGRESS NOTES
I have personally reviewed the history and examination as documented by Dr. Kraus.  I was present during key portions of the visit and agree with the assessment and plan as documented for 35 yr old female here for leg/ foot contusion s/p work-related injury. Exam reassuring. Will hold on imaging. Work modifications recommended. Will follow in 2 weeks. Precautions given. Anticipatory guidance given.     Edd Tan MD  May 11, 2020  11:37 AM

## 2020-05-11 NOTE — TELEPHONE ENCOUNTER
[facility] phone call message- general phone call:    Reason for call: Caller stated that any treatment going further would need to get a request from them prior to patient being seen. Caller is advised of previous appointment on 5/07/2020 and upcoming 5/21/2020.    Return call needed: NO    OK to leave a message on voice mail? Yes    Primary language: Oromo      needed? Yes    Call taken on May 11, 2020 at 1:07 PM by Willie Fuentes

## 2020-05-21 ENCOUNTER — OFFICE VISIT (OUTPATIENT)
Dept: FAMILY MEDICINE | Facility: CLINIC | Age: 35
End: 2020-05-21
Payer: OTHER MISCELLANEOUS

## 2020-05-21 VITALS — HEART RATE: 86 BPM | RESPIRATION RATE: 18 BRPM | DIASTOLIC BLOOD PRESSURE: 60 MMHG | SYSTOLIC BLOOD PRESSURE: 93 MMHG

## 2020-05-21 DIAGNOSIS — S80.02XA CONTUSION OF LEFT KNEE, INITIAL ENCOUNTER: Primary | ICD-10-CM

## 2020-05-21 DIAGNOSIS — S90.31XA CONTUSION OF RIGHT FOOT, INITIAL ENCOUNTER: ICD-10-CM

## 2020-05-21 NOTE — LETTER
Current Work Restrictions.    Re:Shireen George  1985    Primary Provider Mona Kraus    Date of Injury: 5/7/20     Employer: [employer]    Diagnosis: Left knee contusion and right foot contusion       Work Related? Yes. Details: Table fell on patient's knee and foot at work     The employee is ABLE to return to work today.    When the patient returns to work, the following restrictions apply until 6/4/20:  A) Bend: Frequently (4-8 hours)  B) Squat: Frequently (4-8 hours)  C) Walk/Stand: Frequently (4-8 hours)  D) Reach Above Shoulders: Frequently (4-8 hours)  E) Lift, carry, push, and pull no more than:  31-50 lbs.  F) Work hours limited to 10 hours daily with breaks every 2 hours for at least 15 minutes to sit down   No working or lifting restrictions     Fitness for Duty:      Maximum Medical Improvement has not been reached.    Follow-Up:Follow up in 2 weeks.          Mona Kraus, DO

## 2020-06-04 ENCOUNTER — TELEPHONE (OUTPATIENT)
Dept: FAMILY MEDICINE | Facility: CLINIC | Age: 35
End: 2020-06-04

## 2020-06-04 ENCOUNTER — OFFICE VISIT (OUTPATIENT)
Dept: FAMILY MEDICINE | Facility: CLINIC | Age: 35
End: 2020-06-04
Payer: OTHER MISCELLANEOUS

## 2020-06-04 VITALS
SYSTOLIC BLOOD PRESSURE: 92 MMHG | DIASTOLIC BLOOD PRESSURE: 59 MMHG | RESPIRATION RATE: 16 BRPM | WEIGHT: 153 LBS | BODY MASS INDEX: 29.26 KG/M2 | TEMPERATURE: 98.1 F | HEART RATE: 60 BPM | OXYGEN SATURATION: 98 %

## 2020-06-04 DIAGNOSIS — Z02.6 ENCOUNTER RELATED TO WORKER'S COMPENSATION CLAIM: Primary | ICD-10-CM

## 2020-06-04 NOTE — NURSING NOTE
Due to patient being non-English speaking/uses sign language, an  was used for this visit. Only for face-to-face interpretation by an external agency, date and length of interpretation can be found on the scanned worksheet.     name: arlene [id number]  Agency: [employer]  Language: Oromo   Telephone number:   Type of interpretation: Telemedicine, spoken

## 2020-06-04 NOTE — TELEPHONE ENCOUNTER
Caller states she would like office visit notes from todays appt on 06/04/2020. She states to fax to [phone], attn: [email or web address]

## 2020-06-04 NOTE — LETTER
RETURN TO WORK/SCHOOL FORM    6/4/2020    Re: Shireen Vazquez  1985      To Whom It May Concern:     Shireen Vazquez was seen in clinic today..  She may return to work without restrictions on 6/5/20          Restrictions:  None      Abel Ballesteros MD  6/4/2020 8:57 AM
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
## 2020-06-05 NOTE — PROGRESS NOTES
Preceptor Attestation:   Patient seen, evaluated and discussed with the resident. I have verified the content of the note, which accurately reflects my assessment of the patient and the plan of care.  Supervising Physician:Stephaine Christianson DO [facility]

## 2020-07-14 ENCOUNTER — TRANSFERRED RECORDS (OUTPATIENT)
Dept: HEALTH INFORMATION MANAGEMENT | Facility: CLINIC | Age: 35
End: 2020-07-14

## 2020-09-08 ENCOUNTER — OFFICE VISIT (OUTPATIENT)
Dept: FAMILY MEDICINE | Facility: CLINIC | Age: 35
End: 2020-09-08
Payer: COMMERCIAL

## 2020-09-08 VITALS
OXYGEN SATURATION: 99 % | WEIGHT: 157 LBS | BODY MASS INDEX: 29.64 KG/M2 | HEART RATE: 67 BPM | TEMPERATURE: 98 F | SYSTOLIC BLOOD PRESSURE: 92 MMHG | RESPIRATION RATE: 24 BRPM | HEIGHT: 61 IN | DIASTOLIC BLOOD PRESSURE: 60 MMHG

## 2020-09-08 DIAGNOSIS — R35.0 URINARY FREQUENCY: ICD-10-CM

## 2020-09-08 DIAGNOSIS — N30.90 CYSTITIS: Primary | ICD-10-CM

## 2020-09-08 LAB
BACTERIA: NORMAL
BILIRUBIN UR: NEGATIVE MG/DL
BLOOD UR: ABNORMAL MG/DL
CASTS: NORMAL /LPF
CLARITY, URINE: CLEAR
COLOR UR: YELLOW
CRYSTAL URINE: NORMAL /LPF
EPITHELIAL CELLS UR: NORMAL /LPF (ref 0–2)
GLUCOSE URINE: NEGATIVE
KETONES UR QL: NEGATIVE MG/DL
LEUKOCYTE ESTERASE UR: ABNORMAL
MUCOUS URINE: NORMAL LPF
NITRITE UR QL STRIP: NEGATIVE MG/DL
PH UR STRIP: 7 [PH] (ref 4.5–8)
PROTEIN UR: NEGATIVE MG/DL
RBC URINE: <2 /HPF
SP GR UR STRIP: 1.02 (ref 1–1.03)
UROBILINOGEN UR STRIP-ACNC: ABNORMAL E.U./DL
WBC URINE: NORMAL /HPF

## 2020-09-08 RX ORDER — NITROFURANTOIN 25; 75 MG/1; MG/1
100 CAPSULE ORAL 2 TIMES DAILY
Qty: 10 CAPSULE | Refills: 0 | Status: SHIPPED | OUTPATIENT
Start: 2020-09-08 | End: 2020-09-13

## 2020-09-08 ASSESSMENT — MIFFLIN-ST. JEOR: SCORE: 1333.65
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
## 2020-09-08 NOTE — NURSING NOTE
"Chief Complaint   Patient presents with     Urinary Problem     painful/burning sensation and frequency for about      Medication Reconciliation     completed.        BP 92/60 (BP Location: Right arm, Patient Position: Sitting, Cuff Size: Adult Large)   Pulse 67   Temp 98  F (36.7  C) (Oral)   Resp 24   Ht 1.54 m (5' 0.63\")   Wt 71.2 kg (157 lb)   LMP 08/28/2020 (Approximate)   SpO2 99%   BMI 30.03 kg/m      BP Recheck if applicable: NA      ~ Vinny Fuentes CMA ([place])  [facility]  Phone: [phone]      Due to patient being non-English speaking/uses sign language, an  was used for this visit. Only for face-to-face interpretation by an external agency, date and length of interpretation can be found on the scanned worksheet.     name: Jay  Agency: [employer]  Language: [facility]   Telephone number: [phone]  Type of interpretation: Telephone, spoken      "

## 2020-09-08 NOTE — PROGRESS NOTES
Preceptor Attestation:   Patient seen, evaluated and discussed with the resident. I have verified the content of the note, which accurately reflects my assessment of the patient and the plan of care.  Supervising Physician:Benito Lynne MD  [facility]

## 2020-09-10 LAB — CULTURE: ABNORMAL

## 2020-09-17 ENCOUNTER — TELEPHONE (OUTPATIENT)
Dept: FAMILY MEDICINE | Facility: CLINIC | Age: 35
End: 2020-09-17

## 2020-09-17 NOTE — TELEPHONE ENCOUNTER
I got a message sent to me from the  about the pt. It seems the pt called about a referral that was put in. They wanted to know where the pt was sent to for her neuropsychological testing. I called the pt back to let her know the facility. The pt did not answer her phone, so I left a vm of the facility and phone number.     [facility]  [id number]  [place] [address]  [phone]

## 2020-10-20 ENCOUNTER — OFFICE VISIT (OUTPATIENT)
Dept: FAMILY MEDICINE | Facility: CLINIC | Age: 35
End: 2020-10-20
Payer: COMMERCIAL

## 2020-10-20 VITALS
WEIGHT: 152 LBS | DIASTOLIC BLOOD PRESSURE: 60 MMHG | OXYGEN SATURATION: 99 % | RESPIRATION RATE: 16 BRPM | BODY MASS INDEX: 29.84 KG/M2 | HEART RATE: 73 BPM | TEMPERATURE: 98.1 F | SYSTOLIC BLOOD PRESSURE: 98 MMHG | HEIGHT: 60 IN

## 2020-10-20 DIAGNOSIS — R07.0 THROAT PAIN: ICD-10-CM

## 2020-10-20 DIAGNOSIS — H69.93 DYSFUNCTION OF BOTH EUSTACHIAN TUBES: Primary | ICD-10-CM

## 2020-10-20 DIAGNOSIS — Z23 NEED FOR PROPHYLACTIC VACCINATION AND INOCULATION AGAINST INFLUENZA: ICD-10-CM

## 2020-10-20 LAB — S PYO AG THROAT QL IA.RAPID: NEGATIVE

## 2020-10-20 PROCEDURE — 99213 OFFICE O/P EST LOW 20 MIN: CPT | Mod: 25 | Performed by: STUDENT IN AN ORGANIZED HEALTH CARE EDUCATION/TRAINING PROGRAM

## 2020-10-20 PROCEDURE — 90471 IMMUNIZATION ADMIN: CPT | Performed by: STUDENT IN AN ORGANIZED HEALTH CARE EDUCATION/TRAINING PROGRAM

## 2020-10-20 PROCEDURE — 87880 STREP A ASSAY W/OPTIC: CPT | Performed by: STUDENT IN AN ORGANIZED HEALTH CARE EDUCATION/TRAINING PROGRAM

## 2020-10-20 PROCEDURE — 90686 IIV4 VACC NO PRSV 0.5 ML IM: CPT | Performed by: STUDENT IN AN ORGANIZED HEALTH CARE EDUCATION/TRAINING PROGRAM

## 2020-10-20 RX ORDER — FLUTICASONE PROPIONATE 50 MCG
1 SPRAY, SUSPENSION (ML) NASAL 2 TIMES DAILY
Qty: 18.2 ML | Refills: 0 | Status: SHIPPED | OUTPATIENT
Start: 2020-10-20

## 2020-10-20 ASSESSMENT — MIFFLIN-ST. JEOR: SCORE: 1300.97
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
## 2020-10-20 NOTE — NURSING NOTE
Due to patient being non-English speaking/uses sign language, an  was used for this visit. Only for face-to-face interpretation by an external agency, date and length of interpretation can be found on the scanned worksheet.     name: gage  Agency: [employer]  Language: Oromo   Telephone number: [phone]  Type of interpretation: Telephone, spoken

## 2020-10-22 LAB — CULTURE: NORMAL

## 2020-11-08 NOTE — TELEPHONE ENCOUNTER
----- Message from Mona Kraus DO sent at 2/15/2019  8:24 PM CST -----  Regarding: Please call patient to schedule appointment   Please call patient to schedule follow up appointment to discuss results of her pelvic ultrasound and abdominal pain.     Thanks!   Mona Kraus   
Due to patient being non-English speaking/uses sign language, an  was used for this visit. Only for face-to-face interpretation by an external agency, date and length of interpretation can be found on the scanned worksheet.     name: Celeste [id number]  Agency: [employer] - telephone  Language: Joycelyn   Telephone number: [employer]  Type of interpretation: Telephone, spoken       Appointment scheduled for 2/22/2019 at 9:00am  ~ Vinny SARAVIA CMA ([place])            
Abnormal WBC: < 4,000 OR > 12,000

## 2021-04-13 ENCOUNTER — OFFICE VISIT (OUTPATIENT)
Dept: FAMILY MEDICINE | Facility: CLINIC | Age: 36
End: 2021-04-13
Payer: COMMERCIAL

## 2021-04-13 VITALS
DIASTOLIC BLOOD PRESSURE: 62 MMHG | TEMPERATURE: 98.4 F | HEIGHT: 61 IN | RESPIRATION RATE: 20 BRPM | OXYGEN SATURATION: 99 % | SYSTOLIC BLOOD PRESSURE: 94 MMHG | BODY MASS INDEX: 30.4 KG/M2 | HEART RATE: 65 BPM | WEIGHT: 161 LBS

## 2021-04-13 DIAGNOSIS — Z32.02 PREGNANCY EXAMINATION OR TEST, NEGATIVE RESULT: ICD-10-CM

## 2021-04-13 DIAGNOSIS — Z87.2 H/O SKIN PRURITUS: Primary | ICD-10-CM

## 2021-04-13 LAB — HCG UR QL: NEGATIVE

## 2021-04-13 PROCEDURE — 99214 OFFICE O/P EST MOD 30 MIN: CPT | Mod: GC | Performed by: STUDENT IN AN ORGANIZED HEALTH CARE EDUCATION/TRAINING PROGRAM

## 2021-04-13 PROCEDURE — 81025 URINE PREGNANCY TEST: CPT | Performed by: STUDENT IN AN ORGANIZED HEALTH CARE EDUCATION/TRAINING PROGRAM

## 2021-04-13 RX ORDER — MENTHOL 0 G/G
POWDER TOPICAL
Qty: 283 G | Refills: 0 | Status: SHIPPED | OUTPATIENT
Start: 2021-04-13

## 2021-04-13 ASSESSMENT — MIFFLIN-ST. JEOR: SCORE: 1353.05

## 2021-04-13 NOTE — PROGRESS NOTES
Preceptor Attestation:  Patient's case reviewed and discussed with Marlo Borrego MD resident and I evaluated the patient. I agree with written assessment and plan of care.  Supervising Physician:  ANUP HURD MD  [facility]

## 2021-04-13 NOTE — NURSING NOTE
Due to patient being non-English speaking/uses sign language, an  was used for this visit. Only for face-to-face interpretation by an external agency, date and length of interpretation can be found on the scanned worksheet.     name: Haven ID [id number]  Agency: [employer] - iPad  Language: Oromo   Telephone number: NA  Type of interpretation: Telemedicine, spoken
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
## 2021-05-30 NOTE — PROGRESS NOTES
"[facility] Daily Progress     Patient Name: Shireen Vazquez  Date: 7/18/2019  Visit #: 2  PTA visit #:  1  Referral Diagnosis: acute right-sided low back pain without sciatica  Referring provider: Brandy Kelsye MD  Visit Diagnosis:       ICD-10-CM     1. Sacroiliac joint dysfunction of right side M53.3                 Shireen Vazquez is a 34 y.o. female who presents to therapy today with chief complaints of acute right-sided low back pain without sciatica. Symptoms began February 2019 after falling on ice.  Difficulty with standing for long periods of time, lying on her right side, sitting, walking long distance, sitting for long periods of time due to pain.  Pain symptoms are getting worse.  Patient demonstrates signs and sx consistent with right SIJ dysfunction. PT POC and goals have been discussed with patient and She  is agreeable to these. Patient appears motivated for physical therapy and is appropriate for skilled therapy services.        Assessment:   Pt had significant difficulty with clamshell,will likely need review  Pt reports feeling \"very good\" at the end of the visit  Patient is benefitting from skilled physical therapy and is making steady progress toward functional goals.  Patient is appropriate to continue with skilled physical therapy intervention, as indicated by initial plan of care.    Goal Status:  Pt. will demonstrate/verbalize independence in self-management of condition in : 12 weeks  Pt. will be independent with home exercise program in : 6 weeks  Pt. will have improved quality of sleep: with less pain;waking less times/night;in 6 weeks  Pt. will be able to walk : 30 minutes;with less pain;with less difficulty;for household mobility;for community mobility;in 6 weeks;for exercise/recreation  Patient will stand : with less pain;with less difficultty;30 minutes;for home chores;in 6 weeks    No data recorded    Plan / Patient Education:     Continue with initial plan of " "care.  Progress with home program as tolerated.  Plan for next visit: review HEP, educate in self care as needed  Pt to trial sleep positions as discussed in PT and CP use  Subjective:     Pain Rating: R SI pain 8  Intermittent , worse with activity  Objective:   Pt prefers to sleep in SL with upper body rotated back, placing torso on LB and sacral area  Encouraged pt to trial SL with pillow between knees, and lumbar towel  For improved comfort in sleeping  Exercises:  Exercise #1: hip add with 5\" holds, x10 B  Comment #1: seated nerve glides x20 on R  Exercise #2: resisted R hip flexion with 5\" Holds x 10 on R, extensive review  Comment #2: resisted hip ext w/ 5\" holds x10 on R, extensive review  Exercise #3: piriformis stretch,20\" x 2 B  Comment #3: clamshell x 10+ reps B with abd set, extensive cues to stop if pain        Treatment Today     TREATMENT MINUTES COMMENTS   Evaluation     Self-care/ Home management 8 Discussed sleep positions and use of CP use,precautions   Manual therapy     Neuromuscular Re-education     Therapeutic Activity     Therapeutic Exercises 22 Reviewed and progressed HEP Clamshell remains very challenging   Gait training     Modality__________________                Total     Blank areas are intentional and mean the treatment did not include these items.       Shante Hagen,CAITY  7/18/2019    "

## 2021-05-31 NOTE — PROGRESS NOTES
[facility] Discharge Summary  Patient Name: Shireen Vazquez  Date: 9/3/2019  Referral Diagnosis: acute right-sided LBP w/o sciatica  Referring provider: No ref. provider found  Visit Diagnosis:   1. Sacroiliac joint dysfunction of right side         Goals (NOT MET):  Pt. will demonstrate/verbalize independence in self-management of condition in : 12 weeks  Pt. will be independent with home exercise program in : 6 weeks  Pt. will have improved quality of sleep: with less pain;waking less times/night;in 6 weeks  Pt. will be able to walk : 30 minutes;with less pain;with less difficulty;for household mobility;for community mobility;in 6 weeks;for exercise/recreation  Patient will stand : with less pain;with less difficultty;30 minutes;for home chores;in 6 weeks    Patient was seen for 2 visits.  The patient discontinued therapy, did not return.  The patient was issued a HEP and instructed in proper usage.  No further therapy is required at this time.    Therapy will be discontinued at this time.  The patient will need a new referral to resume.    Thank you for your referral.  My Calderon  9/3/2019  3:55 PM

## 2021-06-15 ENCOUNTER — IMMUNIZATION (OUTPATIENT)
Dept: FAMILY MEDICINE | Facility: CLINIC | Age: 36
End: 2021-06-15
Payer: COMMERCIAL

## 2021-06-15 PROCEDURE — 0011A PR COVID VAC MODERNA 100 MCG/0.5 ML IM: CPT

## 2021-06-15 PROCEDURE — 91301 PR COVID VAC MODERNA 100 MCG/0.5 ML IM: CPT

## 2021-06-17 NOTE — PATIENT INSTRUCTIONS - HE
Patient Instructions by Shante Villa PTA at 7/18/2019  3:00 PM     Author: Shante Villa PTA Service: -- Author Type: Physical Therapist Assistant    Filed: 7/18/2019  4:22 PM Encounter Date: 7/18/2019 Status: Signed    : Shante Villa PTA (Physical Therapist Assistant)        CLAM SHELLS    While lying on your side with your knees bent, draw up the top knee while keeping contact of your feet together.    Do not let your pelvis roll back during the lifting movement.    abds tight  X 10 each side   STOP IF PAIN      PIRIFORMIS STRETCH - MODIFIED    While lying on your back, hold your knee with your opposite hand and draw your knee up and over towards your opposite shoulder.  HOLD X 20 SECONDS   2 REPS EACH SIDE

## 2021-06-17 NOTE — PATIENT INSTRUCTIONS - HE
Patient Instructions by My Calderon PT at 7/11/2019  2:30 PM     Author: My Calderon PT Service: -- Author Type: Physical Therapist    Filed: 7/11/2019  2:59 PM Encounter Date: 7/11/2019 Status: Addendum    : My Calderon PT (Physical Therapist)    Related Notes: Original Note by My Calderon PT (Physical Therapist) filed at 7/11/2019  2:57 PM       It is recommended that you ice 2-3x/day for 20 minutes at a time. Remember to not apply ice directly on skin.    Avoid sitting for more than 30 minutes at a time. Try to get up and move around after 30-45 minutes     Seated nerve glides    In seated position, kick your right leg up and down 20 times. Perform these 5 times/day.       BALL SQUEEZE - SEATED    While sitting, place a ball between your knees. Squeeze the ball with your knees and hold for 5 seconds. Do 15 reps at a time, 3x/day      ANTERIOR ROTATION CORRECTION    While lying on your back, raise up your knee and press it into your OPPOSITE hand.     Hold 5 seconds, then relax.    Repeat 10 times. Do 3 times per day.        POSTERIOR ROTATION CORRECTION    While lying on your back, raise up your knee and grab around your thigh with both hands.     Press your thigh into your hands and hold for 5 seconds, then relax.    Repeat 10 times. Do 3x/day on your right side.

## 2021-07-13 ENCOUNTER — IMMUNIZATION (OUTPATIENT)
Dept: FAMILY MEDICINE | Facility: CLINIC | Age: 36
End: 2021-07-13
Attending: FAMILY MEDICINE
Payer: COMMERCIAL

## 2021-07-13 PROCEDURE — 91301 PR COVID VAC MODERNA 100 MCG/0.5 ML IM: CPT

## 2021-07-13 PROCEDURE — 0012A PR COVID VAC MODERNA 100 MCG/0.5 ML IM: CPT

## 2021-08-10 ENCOUNTER — OFFICE VISIT (OUTPATIENT)
Dept: FAMILY MEDICINE | Facility: CLINIC | Age: 36
End: 2021-08-10
Payer: COMMERCIAL

## 2021-08-10 VITALS
SYSTOLIC BLOOD PRESSURE: 98 MMHG | WEIGHT: 158 LBS | RESPIRATION RATE: 20 BRPM | HEART RATE: 62 BPM | DIASTOLIC BLOOD PRESSURE: 63 MMHG | OXYGEN SATURATION: 99 % | BODY MASS INDEX: 29.83 KG/M2

## 2021-08-10 DIAGNOSIS — Z02.89 ENCOUNTER FOR COMPLETION OF FORM WITH PATIENT: Primary | ICD-10-CM

## 2021-08-10 DIAGNOSIS — Z12.4 CERVICAL CANCER SCREENING: ICD-10-CM

## 2021-08-10 PROCEDURE — 87624 HPV HI-RISK TYP POOLED RSLT: CPT | Performed by: STUDENT IN AN ORGANIZED HEALTH CARE EDUCATION/TRAINING PROGRAM

## 2021-08-10 PROCEDURE — G0123 SCREEN CERV/VAG THIN LAYER: HCPCS | Performed by: STUDENT IN AN ORGANIZED HEALTH CARE EDUCATION/TRAINING PROGRAM

## 2021-08-10 PROCEDURE — 99214 OFFICE O/P EST MOD 30 MIN: CPT | Mod: GC | Performed by: STUDENT IN AN ORGANIZED HEALTH CARE EDUCATION/TRAINING PROGRAM
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
## 2021-08-10 NOTE — LETTER
August 18, 2021      Shireen George  [address] [place] [address]        Dear ,    We are writing to inform you of your test results.    Your test results fall within the expected range(s) or remain unchanged from previous results.  Please continue with current treatment plan.    Resulted Orders   GYN Cytology   Result Value Ref Range    Interpretation        Negative for Intraepithelial Lesion or Malignancy (NILM)    Specimen Adequacy       Satisfactory for evaluation, endocervical/transformation zone component present    Clinical Information       none      HPV Reflex Yes regardless of result     Previous Abnormal?       No      Performing Labs       The technical component of this testing was completed at [facility]     HPV High Risk Types DNA Cervical   Result Value Ref Range    Other HR HPV Negative Negative    HPV16 DNA Negative Negative    HPV18 DNA Negative Negative    FINAL DIAGNOSIS       This patient's sample is negative for HPV DNA.        This test was developed and its performance characteristics determined by the [facility]. It has not been cleared or approved by the FDA. The laboratory is regulated under CLIA as qualified to perform high-complexity testing. This test is used for clinical purposes. It should not be regarded as investigational or for research.    METHODOLOGY: The Roche Deidre 4800 system uses automated extraction, simultaneous amplification of HPV (L1 region) and beta-globin, followed by real time detection of fluorescent labeled HPV and beta globin using specific oligonucleotide probes. The test specifically identified types HPV 16 DNA and HPV 18 DNA while concurrently detecting the rest of the high risk types (31, 33, 35, 39, 45, 51, 52, 56, 58, 59, 66 or 68).    COMMENTS: This test is not intended for use as a screening device for woman under age 30 with normal cervical cytology.  Results should be correlated with cytologic and histologic findings. Close clinical followup is recommended.           If you have any questions or concerns, please call the clinic at the number listed above.       Sincerely,      Chantel Silveira MD

## 2021-08-12 ENCOUNTER — DOCUMENTATION ONLY (OUTPATIENT)
Dept: PSYCHOLOGY | Facility: CLINIC | Age: 36
End: 2021-08-12

## 2021-08-12 ENCOUNTER — TELEPHONE (OUTPATIENT)
Dept: FAMILY MEDICINE | Facility: CLINIC | Age: 36
End: 2021-08-12

## 2021-08-12 NOTE — TELEPHONE ENCOUNTER
Outreach Date: 08/12/21 Time: 1039.  Able to be Reached: No  Voicemail Left: Yes  Reason for Referral: neuropsych testing  Location of appointment:   Appointment Date:   Appointment Time:   Phone number of location:   Fax number of location:   Additional Information: 1st attempt to contact, [employer]  was used and  left to call back. Antonio TOSCANO

## 2021-08-12 NOTE — LETTER
August 17, 2021      Shireen Vazquez  [address] [place] [address]        Dear Shireen,    Below is the location for the neuropsych testing. Please call them to schedule an appointment.    [facility]  [address] [place] [address] [address]  [place], [place] [address] [phone] Phone  [phone] Fax  Hours: M-F 7:30-5:30pm    Sincerely,    Chelsy Del Rio RN

## 2021-08-12 NOTE — PROGRESS NOTES
Patient requesting Immigration Form [id number] (medical disability citizenship exam waiver) filled out due to inability to learn English. Patient has history of PTSD with continued intrusive thoughts. Patient had neuropsych testing completed in July 2020, and is now seeking a second opinion of those results. We are requesting neuropsychological testing to help determine if patient has cognitive functioning concerns that could impact her ability to learn [place] civics information and/or English. Thank you.    [facility]  [facility]  [address], [place] [address]  [phone] Phone  [phone] Fax  Hours: M-F 7:30-5:30pm     [facility])- some providers offering in person care  [facility] [place] [address] [address],   [place] [address] [phone] Phone  [phone] Fax  M-Th: 8:30-5pm  F: 8:30-4:30pm       [facility]  [address] [facility]  [address]  [place], [place] [id number] [phone] Phone  [phone] Fax  M-F 7:30AM-7PM  Saturday 8AM-2PM

## 2021-08-13 LAB
HUMAN PAPILLOMA VIRUS 16 DNA: NEGATIVE
HUMAN PAPILLOMA VIRUS 18 DNA: NEGATIVE
HUMAN PAPILLOMA VIRUS FINAL DIAGNOSIS: NORMAL
HUMAN PAPILLOMA VIRUS OTHER HR: NEGATIVE

## 2021-08-13 NOTE — PROGRESS NOTES
Preceptor Attestation:  Patient's case reviewed and discussed with the resident, Cornelio Melchor MD, and I personally evaluated the patient. I agree with written assessment and plan of care.    Supervising Physician:  Chantel Silveira MD   [facility]

## 2021-08-17 LAB
BKR LAB AP GYN ADEQUACY: NORMAL
BKR LAB AP GYN INTERPRETATION: NORMAL
BKR LAB AP HPV REFLEX: NORMAL
BKR LAB AP PREVIOUS ABNORMAL: NORMAL
PATH REPORT.COMMENTS IMP SPEC: NORMAL
PATH REPORT.RELEVANT HX SPEC: NORMAL

## 2021-08-17 NOTE — TELEPHONE ENCOUNTER
Outreach Date: 08/17/21 Time: 1041.  Able to be Reached: Yes  Voicemail Left: No  Reason for Referral: neuropsych testing  Location of appointment: [facility]  Appointment Date:   Appointment Time:   Phone number of location: [phone]  Fax number of location: [phone]  Additional Information: Letter sent with location number and address. Referral information faxed to [facility]

## 2021-09-21 NOTE — TELEPHONE ENCOUNTER
An [facility]  was used for this call. Called patient to obtain appointment information, no answer. Left VM to call back. Antonio TOSCANO